# Patient Record
Sex: MALE | Race: WHITE | NOT HISPANIC OR LATINO | Employment: OTHER | URBAN - METROPOLITAN AREA
[De-identification: names, ages, dates, MRNs, and addresses within clinical notes are randomized per-mention and may not be internally consistent; named-entity substitution may affect disease eponyms.]

---

## 2020-08-12 ENCOUNTER — TRANSCRIBE ORDERS (OUTPATIENT)
Dept: ADMINISTRATIVE | Facility: HOSPITAL | Age: 75
End: 2020-08-12

## 2020-08-12 DIAGNOSIS — Z13.6 ENCOUNTER FOR SCREENING FOR VASCULAR DISEASE: Primary | ICD-10-CM

## 2020-09-03 ENCOUNTER — HOSPITAL ENCOUNTER (OUTPATIENT)
Dept: CARDIOLOGY | Facility: HOSPITAL | Age: 75
Discharge: HOME | End: 2020-09-03
Admitting: SURGERY

## 2020-09-03 VITALS
DIASTOLIC BLOOD PRESSURE: 84 MMHG | HEIGHT: 68 IN | HEART RATE: 68 BPM | SYSTOLIC BLOOD PRESSURE: 148 MMHG | BODY MASS INDEX: 28.04 KG/M2 | WEIGHT: 185 LBS

## 2020-09-03 DIAGNOSIS — Z13.6 ENCOUNTER FOR SCREENING FOR VASCULAR DISEASE: ICD-10-CM

## 2020-09-03 LAB
BH CV ECHO MEAS - DIST AO DIAM: 1.46 CM
BH CV VAS BP LEFT ARM: NORMAL MMHG
BH CV VAS BP RIGHT ARM: NORMAL MMHG
BH CV XLRA MEAS - MID AO DIAM: 1.7 CM
BH CV XLRA MEAS - PAD LEFT ABI DP: 1.35
BH CV XLRA MEAS - PAD LEFT ABI PT: 1.35
BH CV XLRA MEAS - PAD LEFT ARM: 148 MMHG
BH CV XLRA MEAS - PAD LEFT LEG DP: 200 MMHG
BH CV XLRA MEAS - PAD LEFT LEG PT: 200 MMHG
BH CV XLRA MEAS - PAD RIGHT ABI DP: 1.35
BH CV XLRA MEAS - PAD RIGHT ABI PT: 1.35
BH CV XLRA MEAS - PAD RIGHT ARM: 146 MMHG
BH CV XLRA MEAS - PAD RIGHT LEG DP: 200 MMHG
BH CV XLRA MEAS - PAD RIGHT LEG PT: 200 MMHG
BH CV XLRA MEAS - PROX AO DIAM: 1.91 CM
BH CV XLRA MEAS LEFT ICA/CCA RATIO: 1.64
BH CV XLRA MEAS LEFT MID CCA PSV: NORMAL CM/SEC
BH CV XLRA MEAS LEFT MID ICA PSV: NORMAL CM/SEC
BH CV XLRA MEAS LEFT PROX ECA PSV: NORMAL CM/SEC
BH CV XLRA MEAS RIGHT ICA/CCA RATIO: 1.02
BH CV XLRA MEAS RIGHT MID CCA PSV: NORMAL CM/SEC
BH CV XLRA MEAS RIGHT MID ICA PSV: NORMAL CM/SEC
BH CV XLRA MEAS RIGHT PROX ECA PSV: NORMAL CM/SEC

## 2020-09-03 PROCEDURE — 93799 UNLISTED CV SVC/PROCEDURE: CPT

## 2021-01-25 ENCOUNTER — IMMUNIZATION (OUTPATIENT)
Dept: VACCINE CLINIC | Facility: HOSPITAL | Age: 76
End: 2021-01-25

## 2021-01-25 PROCEDURE — 0001A: CPT | Performed by: INTERNAL MEDICINE

## 2021-01-25 PROCEDURE — 91300 HC SARSCOV02 VAC 30MCG/0.3ML IM: CPT | Performed by: INTERNAL MEDICINE

## 2021-02-15 ENCOUNTER — IMMUNIZATION (OUTPATIENT)
Dept: VACCINE CLINIC | Facility: HOSPITAL | Age: 76
End: 2021-02-15

## 2021-02-15 PROCEDURE — 0002A: CPT | Performed by: INTERNAL MEDICINE

## 2021-02-15 PROCEDURE — 91300 HC SARSCOV02 VAC 30MCG/0.3ML IM: CPT | Performed by: INTERNAL MEDICINE

## 2021-10-13 ENCOUNTER — TELEPHONE (OUTPATIENT)
Dept: ORTHOPEDIC SURGERY | Facility: CLINIC | Age: 76
End: 2021-10-13

## 2021-10-13 NOTE — TELEPHONE ENCOUNTER
Caller: Deepak Ruiz    Relationship: Self    Best call back number: [phone]    What is the medical concern/diagnosis: NECK PAIN AFTER A ROUND OF GOLF    What specialty or service is being requested: REFERRAL TO SPINE    What is the provider, practice or medical service name: DR PUENTES

## 2021-10-13 NOTE — TELEPHONE ENCOUNTER
Spoke with patient and told him that he would need to get a referral from his PCP to see Dr. Vega

## 2022-11-22 ENCOUNTER — OFFICE VISIT (OUTPATIENT)
Dept: ORTHOPEDIC SURGERY | Facility: CLINIC | Age: 77
End: 2022-11-22

## 2022-11-22 VITALS — RESPIRATION RATE: 12 BRPM | TEMPERATURE: 98.1 F | BODY MASS INDEX: 27.74 KG/M2 | HEIGHT: 68 IN | WEIGHT: 183 LBS

## 2022-11-22 DIAGNOSIS — M25.512 LEFT SHOULDER PAIN, UNSPECIFIED CHRONICITY: ICD-10-CM

## 2022-11-22 DIAGNOSIS — R22.32 MASS OF SKIN OF LEFT SHOULDER: Primary | ICD-10-CM

## 2022-11-22 PROCEDURE — 73030 X-RAY EXAM OF SHOULDER: CPT | Performed by: ORTHOPAEDIC SURGERY

## 2022-11-22 PROCEDURE — 99203 OFFICE O/P NEW LOW 30 MIN: CPT | Performed by: ORTHOPAEDIC SURGERY

## 2022-11-22 RX ORDER — ATORVASTATIN CALCIUM 20 MG/1
20 TABLET, FILM COATED ORAL DAILY
COMMUNITY
Start: 2022-09-21

## 2022-11-22 RX ORDER — ASPIRIN 81 MG/1
81 TABLET ORAL DAILY
COMMUNITY

## 2022-11-22 RX ORDER — LOSARTAN POTASSIUM 25 MG/1
25 TABLET ORAL DAILY
COMMUNITY
Start: 2022-09-06

## 2022-11-22 RX ORDER — UBIDECARENONE 50 MG
CAPSULE ORAL DAILY
COMMUNITY

## 2022-11-27 NOTE — PROGRESS NOTES
General Exam    Patient: Deepak Ruiz    YOB: 1945    Medical Record Number: [id number]    Chief Complaints: Left shoulder pain    History of Present Illness:     77 y.o. male patient who presents for evaluation treatment left shoulder pain.  Patient states he has been having issues for about a month and a half.  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  Reports there is like a band across the proximal aspect of his shoulder laterally based that he feels is giving him some discomfort.  Not all the time and does not really have any complaints with discomfort with shoulder motion.  Does have some difficulty at night with some pain and laying on that shoulder.    Denies any numbness or tingling.  Denies any fevers, cough or shortness of breath.    Allergies:   Allergies   Allergen Reactions   • Glycopyrrolate Other (See Comments)     BLADDER SHUT DOWN   • Penicillins    • Penicillin V Rash       Home Medications:      Current Outpatient Medications:   •  aspirin 81 MG EC tablet, Take 81 mg by mouth Daily., Disp: , Rfl:   •  atorvastatin (LIPITOR) 20 MG tablet, Take 20 mg by mouth Daily., Disp: , Rfl:   •  CIALIS 5 MG tablet, , Disp: , Rfl:   •  coenzyme Q10 50 MG capsule capsule, Take  by mouth Daily., Disp: , Rfl:   •  losartan (COZAAR) 25 MG tablet, Take 25 mg by mouth Daily., Disp: , Rfl:   •  Multiple Vitamin (MULTI VITAMIN DAILY PO), Take  by mouth., Disp: , Rfl:   •  Zinc Sulfate (ZINC 15 PO), Take  by mouth., Disp: , Rfl:   •  Multiple Vitamins-Minerals (ZINC PO), Take  by mouth., Disp: , Rfl:     History reviewed. No pertinent past medical history.    Past Surgical History:   Procedure Laterality Date   • COLONOSCOPY     • HEMORRHOIDECTOMY     • PROSTATE SURGERY     • TONSILLECTOMY     • WART REMOVAL     • WISDOM TOOTH EXTRACTION         Social History     Occupational History   • Not on file   Tobacco Use   • Smoking status: Never   • Smokeless tobacco:  "Former   • Tobacco comments:     Social for 8-10 years   Vaping Use   • Vaping Use: Never used   Substance and Sexual Activity   • Alcohol use: Yes     Alcohol/week: 2.0 standard drinks     Types: 2 Cans of beer per week   • Drug use: No   • Sexual activity: Not on file      Social History     Social History Narrative   • Not on file       History reviewed. No pertinent family history.    Review of Systems:      Constitutional: Denies fever, shaking or chills         All other pertinent positives and negatives as noted above in HPI.    Physical Exam: 77 y.o. male    Vitals:    11/22/22 1454   Resp: 12   Temp: 98.1 °F (36.7 °C)   Weight: 83 kg (183 lb)   Height: 172.7 cm (68\")       General:  Patient is awake and alert.  Appears in no acute distress or discomfort.        Musculoskeletal/Extremities:    Left upper extremity was examined I could palpate some cystic type masses along the lateral aspect of the shoulder with patient identified he also stated he has had symptoms along his chest area.  Overall shoulder range of motion is full and painless.  Rotator cuff strength intact.  Negative Bronson, Neer's, liftoff and empty can sign.  Motor and sensory intact distally.         Radiology:       3 views left shoulder AP, scapular Y and axillary taken reviewed to evaluate the patient's complaint/s.    Imaging shows some possible mild degenerative changes of the glenohumeral joint, mild to moderate degenerative changes of the AC joint.  There is also some superior migration of the humeral head in reference to the glenoid.  There is apparent subtle cystic type changes involving the humeral head slight density/irregularity within the proximal aspect of the humeral head does not appear to be lytic or blastic in nature no cortical involvement.  No cortical disruption noted.     No imaging for comparison.    Assessment: Left shoulder pain with soft tissue masses      Plan:      Discussed finds the patient it seems that some " of this discomfort could be coming from the cystic type masses that he describes and that are palpable on exam.  Overall his motor and sensory are intact.  There could be a component of his rotator cuff is well given radiographic findings.  Also just given some abnormalities on the radiographs and his history I think an MRI for further evaluation is warranted.  I like the patient to then follow-up with my shoulder specialist partner Dr. Aj once MRI has been completed.  There is any new issues during interim he was instructed to follow-up sooner.           We will plan for follow up as instructed.    All questions were answered.  Patient understands and agrees with the plan.    Nasir Keith MD    11/22/2022    CC to Rajeev Murphy MD

## 2022-12-16 ENCOUNTER — HOSPITAL ENCOUNTER (OUTPATIENT)
Dept: MRI IMAGING | Facility: HOSPITAL | Age: 77
Discharge: HOME | End: 2022-12-16
Admitting: ORTHOPAEDIC SURGERY

## 2022-12-16 DIAGNOSIS — R22.32 MASS OF SKIN OF LEFT SHOULDER: ICD-10-CM

## 2022-12-16 PROCEDURE — 73221 MRI JOINT UPR EXTREM W/O DYE: CPT

## 2022-12-28 ENCOUNTER — OFFICE VISIT (OUTPATIENT)
Dept: ORTHOPEDIC SURGERY | Facility: CLINIC | Age: 77
End: 2022-12-28

## 2022-12-28 VITALS — WEIGHT: 189.3 LBS | TEMPERATURE: 97.5 F | HEIGHT: 69 IN | BODY MASS INDEX: 28.04 KG/M2

## 2022-12-28 DIAGNOSIS — M25.512 CHRONIC LEFT SHOULDER PAIN: Primary | ICD-10-CM

## 2022-12-28 DIAGNOSIS — G89.29 CHRONIC LEFT SHOULDER PAIN: Primary | ICD-10-CM

## 2022-12-28 PROCEDURE — 99213 OFFICE O/P EST LOW 20 MIN: CPT | Performed by: ORTHOPAEDIC SURGERY

## 2022-12-28 NOTE — PROGRESS NOTES
Patient: Deepak Ruiz    YOB: 1945    Medical Record Number: [id number]    Chief Complaints: Left shoulder pain    History of Present Illness:     77 y.o. male patient who presents for evaluation of his left shoulder.  He has previously seen my partner for this issue and was referred to me as I have treated his wife in the past.  He does not recall any specific inciting event or factor.  The symptoms were gradual in onset.  He has a history of multiple lipomas and he thought that he may have a symptomatic lipoma in the shoulder.  He was recently referred for an MRI and has that study available for review.  He says the pain tends to be worse at night and when driving.  He is an avid golfer and is still able to play golf.  Current pain is mild and aching.    Allergies:   Allergies   Allergen Reactions   • Glycopyrrolate Other (See Comments)     BLADDER SHUT DOWN   • Penicillins    • Penicillin V Rash       Home Medications:    Current Outpatient Medications:   •  aspirin 81 MG EC tablet, Take 81 mg by mouth Daily., Disp: , Rfl:   •  atorvastatin (LIPITOR) 20 MG tablet, Take 20 mg by mouth Daily., Disp: , Rfl:   •  CIALIS 5 MG tablet, , Disp: , Rfl:   •  coenzyme Q10 50 MG capsule capsule, Take  by mouth Daily., Disp: , Rfl:   •  losartan (COZAAR) 25 MG tablet, Take 25 mg by mouth Daily., Disp: , Rfl:   •  Multiple Vitamin (MULTI VITAMIN DAILY PO), Take  by mouth., Disp: , Rfl:   •  Multiple Vitamins-Minerals (ZINC PO), Take  by mouth., Disp: , Rfl:   •  Zinc Sulfate (ZINC 15 PO), Take  by mouth., Disp: , Rfl:     History reviewed. No pertinent past medical history.    Past Surgical History:   Procedure Laterality Date   • COLONOSCOPY     • HEMORRHOIDECTOMY     • PROSTATE SURGERY     • TONSILLECTOMY     • WART REMOVAL     • WISDOM TOOTH EXTRACTION         Social History     Occupational History   • Not on file   Tobacco Use   • Smoking status: Never   • Smokeless tobacco: Former   • Tobacco comments:     " Social for 8-10 years   Vaping Use   • Vaping Use: Never used   Substance and Sexual Activity   • Alcohol use: Yes     Alcohol/week: 2.0 standard drinks     Types: 2 Cans of beer per week   • Drug use: No   • Sexual activity: Not on file      Social History     Social History Narrative   • Not on file       History reviewed. No pertinent family history.    Review of Systems:      Constitutional: Denies fever, shaking or chills   Eyes: Denies change in visual acuity   HEENT: Denies nasal congestion or sore throat   Respiratory: Denies cough or shortness of breath   Cardiovascular: Denies chest pain or edema  Endocrine: Denies tremors, palpitations, intolerance of heat or cold, polyuria, polydipsia.  GI: Denies abdominal pain, nausea, vomiting, bloody stools or diarrhea  : Denies frequency, urgency, incontinence, retention, or nocturia.  Musculoskeletal: Denies numbness, tingling or loss of motor function except as above  Integument: Denies rash, lesion or ulceration   Neurologic: Denies headache or focal weakness, deficits  Heme: Denies spontaneous or excessive bleeding, epistaxis, hematuria, melena, fatigue, enlarged or tender lymph nodes.      All other pertinent positives and negatives as noted above in HPI.    Physical Exam:   77 y.o. male  Vitals:    12/28/22 0807   Temp: 97.5 °F (36.4 °C)   TempSrc: Temporal   Weight: 85.9 kg (189 lb 4.8 oz)   Height: 175.3 cm (69\")     General:  Patient is awake and alert.  Appears in no acute distress or discomfort.    Psych:  Affect and demeanor are appropriate.    Cardiovascular:  Regular rate and rhythm.    Lungs:  Good chest expansion.  Breathing unlabored.    Extremities: Left shoulder is examined.  Skin is benign and intact.  Small mass palpable in the subcutaneous tissues consistent with possible lipoma.  This area is nontender.  No fluctuance or effusion.  No increased warmth.  Full motion.  He has pain and weakness with forward elevation in the scapular plane.  " Normal motor and sensory function in the lower arm and hand.  Palpable radial pulse.  Brisk capillary refill.    Imaging: MRI of the left shoulder is reviewed along with the associated report.  I have independently interpreted the images.  I agree with the radiology assessment.  He has a high-grade partial-thickness rotator cuff tear.  Radiology interpretation is listed below:    IMPRESSION:  Impingement morphology at the left acromion and  acromioclavicular joint with a broad, near full-thickness rotator cuff  tear involving the supraspinatus and infraspinatus tendons as discussed  above. There is no tendon retraction or muscle atrophy. No soft tissue  mass or cystic lesion is present anywhere around the left shoulder.    Assessment/Plan:  Left rotator cuff tear    He has what appears to be a high-grade partial-thickness tear.  We had a long discussion about the natural history of this condition and risk of tear progression.  We discussed all available treatment options, both surgical and nonsurgical.  He acknowledged understanding of this information.  All questions posed by him were answered in detail.  At this time, he has elected for nonsurgical treatment.  I offered him an injection for the pain.  He says the pain is not bad enough to justify that.  I offered him a referral to PT but he says he would prefer to do a home program.  I suggested that a prescription strength anti-inflammatory may be a good option for him but I asked him about GI issues.  He tells me that he has been battling some reflux issues right now.  I suggested he talk to his PCP about that issue but in the meantime it is probably best that he stick with Tylenol as needed.  For now, he will follow-up with me on an as-needed basis.  He asked about playing golf.  I told him I think that would be fine.    Zach Aj MD    12/28/2022

## 2023-08-04 ENCOUNTER — OFFICE VISIT (OUTPATIENT)
Dept: ORTHOPEDIC SURGERY | Facility: CLINIC | Age: 78
End: 2023-08-04
Payer: MEDICARE

## 2023-08-04 VITALS — BODY MASS INDEX: 27.64 KG/M2 | TEMPERATURE: 98.3 F | WEIGHT: 186.6 LBS | HEIGHT: 69 IN

## 2023-08-04 DIAGNOSIS — M75.101 TEAR OF RIGHT ROTATOR CUFF, UNSPECIFIED TEAR EXTENT, UNSPECIFIED WHETHER TRAUMATIC: ICD-10-CM

## 2023-08-04 DIAGNOSIS — R52 PAIN: Primary | ICD-10-CM

## 2023-08-04 PROCEDURE — 99213 OFFICE O/P EST LOW 20 MIN: CPT | Performed by: ORTHOPAEDIC SURGERY

## 2023-08-04 RX ORDER — MELATONIN
1000 DAILY
COMMUNITY

## 2023-08-04 RX ORDER — OMEPRAZOLE 40 MG/1
40 CAPSULE, DELAYED RELEASE ORAL DAILY
COMMUNITY
Start: 2023-04-23

## 2025-02-27 ENCOUNTER — TELEPHONE (OUTPATIENT)
Dept: ORTHOPEDIC SURGERY | Facility: CLINIC | Age: 80
End: 2025-02-27

## 2025-02-27 NOTE — TELEPHONE ENCOUNTER
"Caller: Deepak Ruiz    Relationship: Self    Best call back number: [phone]    What orders are you requesting (i.e. lab or imaging): REFERRAL TO [facility] FOR LOWER BACK PAIN     In what timeframe would the patient need to come in: ASAP    Where will you receive your lab/imaging services: [address]    Additional notes: PATIENT STATES THAT HIS FAMILY DOCTOR IS NOT THE OFFICE RIGHT NOW DUE TO CANCER TREATMENT AND HE CAN NOT GET A REFERRAL TO SEE [facility] FOR HIS BACK. HE WOULD LIKE DR PERRY TO REFER HIM TO [facility]. HE SAID TO LET HIM KNOW HE AND HIS WIFE HAVE BEEN \"COMING THERE FOR A LONG TIME.\" PLEASE ADVISE.          "

## 2025-02-28 NOTE — PROGRESS NOTES
Patient Name: Deepak Ruiz   YOB: 1945  Referring Primary Care Physician: Rajeev Murphy MD      Chief Complaint:    Chief Complaint   Patient presents with    Cervical Spine - Initial Evaluation, Pain        Previous Treatment:     PT for neck pain? No     MRI of C-Spine? No     Neurosurgery:   08/25/2011 - [facility] Randy Cantu MD           Neck Pain   This is a chronic problem. The current episode started more than 1 year ago. The problem occurs constantly. The problem has been gradually worsening. The pain is present in the left side, midline and right side. The quality of the pain is described as aching and cramping. The pain is at a severity of 3/10 (7/10 at worse). Pain severity now: mild - severe. The symptoms are aggravated by coughing and sneezing (Turning head left/right, looking up/down). Stiffness is present All day. Associated symptoms include headaches. Pertinent negatives include no numbness, tingling or weakness. He has tried acetaminophen and chiropractic manipulation for the symptoms. The treatment provided mild relief.        HPI:  Deepak Ruiz is a 79 y.o. male who presents to [facility] for evaluation of above complaints.  He primarily complains of axial neck pain referring throughout the trapezii as well as loss of range of motion of the cervical spine.  Denies any pain radiating down upper extremities.  Denies imbalance or incoordination or saddle anesthesia.  Denies any injury associated with the symptoms.  He is very active although does report quit playing golf is to avoid aggravating the symptoms.  Secondary complaint of chronic and worsening low back pain.  He was evaluated at [facility] in August 2024 and was referred to physical therapy.  There was some discussion of trialing injections but patient said there was no further follow-up.  This is an established patient to practice, new to me.  He  is unaccompanied today.  Prior pertinent records were reviewed.    PFSH:  See attached    ROS: As per HPI, otherwise negative    Objective:      79 y.o. male  Body mass index is 26.46 kg/m²., 81.3 kg (179 lb 3.2 oz), @HT@  Vitals:    03/03/25 0952   Temp: 97.8 °F (36.6 °C)     Pain Score    03/03/25 0952   PainSc: 3   Comment: 7/10 at worse   PainLoc: Neck            Spine Musculoskeletal Exam    Gait    Gait is normal.    Inspection    Coronal balance: no imbalance    Sagittal balance: no imbalance    Palpation    Cervical Spine    Tenderness: present      Paraspinous: right and left    Strength    Cervical Spine    Cervical spine motor exam is normal.    Sensory    Cervical Spine    Cervical spine sensation is normal.    Reflexes    Right        Biceps: 1/4      Brachioradialis: 1/4      Triceps: 1/4      Nava: absent    Left        Biceps: 1/4        Brachioradialis: 1/4      Triceps: 1/4      Nava: absent    General      Constitutional: well-developed and well-nourished    Scleral icterus: no    Labored breathing: no    Psychiatric: normal mood and affect and no acute distress    Neurological: alert and oriented x3    Skin: intact        IMAGING:     Indication: pain related symptoms,  Views: 2V AP&LAT cervical  Findings: Loss of normal lordosis, multilevel degenerative change with disc space settling most pronounced at C5-C6 where there is slight retrolisthesis of C5 on C6, suspect some ossification of the nuchal ligament posterior to the C6 spinous process  Comparison views: None for direct review      Official radiology interpretation will follow and be available in the patient medical records. Patient will be notified of any pertinent discrepancies.  Addendum 03/04/2025:  C1 and C2 are partially obscured on the AP view. C7-T1 is partially  obscured on the lateral view. Mild reversal of the normal cervical  lordosis could be related to patient positioning and/or degenerative  change. Mild likely  chronic/degenerative grade 1 retrolisthesis of C4 on  C5 and C5 on C6. Multilevel degenerative disc space narrowing and  degenerative endplate changes, most prominent at C4-C5 and C5-C6, where  there is mild anterior osteophyte formation. Multilevel disc  hypertrophy. The prevertebral soft tissues are unremarkable. Partial  ossification of the nuchal ligament. The included lung apices are clear  Assessment:           Diagnoses and all orders for this visit:    1. DDD (degenerative disc disease), cervical (Primary)  -     MRI Cervical Spine Without Contrast; Future  -     Ambulatory Referral to Physical Therapy for Evaluation & Treatment             Plan:  We reviewed the cervical plain films in office today as well as the report of the thoracic and lumbar MRIs from his visit with [place] [facility].  Today his primary complaint is the neck pain and stiffness although the low back pain slows him down nearly as much.  He has no loss of nerve function on exam today and no persistent radiculopathy.  We did discuss the importance of trialing physical therapy.  He says he has tried therapy in the past and really did not notice much benefit but is willing to try again.  Will also submit for cervical MRI with an eye toward injection therapy.  Will plan on seeing him back in about 6 weeks to see how he is progressing with physical therapy, discussed the MRI results and next treatment option.  Call in the meantime with questions or concerns.    Return in about 6 weeks (around 4/14/2025).    EMR Dragon/Transcription Disclaimer:   Much of this encounter note is an electronic transcription/translation of spoken language to printed text. The electronic translation of spoken language may permit erroneous, or at times, nonsensical words or phrases to be inadvertently transcribed; Although I have reviewed the note for such errors, some may still exist.  Red flags have been discussed at this or previous visits to include but not  limited weakness in extremities, worsening pain that does not respond to conservative treatment and bowel or bladder dysfunction. These are reasons to present to ER and patient has been informed.    The diagnosis(es), natural history, pathophysiology and treatment for diagnosis(es) were discussed. Opportunity given and questions answered. Biomechanics of pertinent body areas discussed.    EXERCISES:  Advice on benefits of, and types of regular/moderate exercise pertaining to diagnosis.  Continue HEP. For back or neck pain, recommend pilates and or yoga as tolerated. Generally it is best to start any new exercise under the guidance of a  or therapist.   MEDICATIONS:  When prescribe, the risks, benefits, warnings,side effects and alternatives of medications discussed. Advised against long term use of narcotics.   PAIN CONTROL:  Cold, heat, OTC lidocaine patches and/or ointment as needed. Avoid direct skin contact with ice. Ice 15-20 minutes 3-4 times daily as needed. For SI joint pain, recommend ice bath in water about 50 degrees for 5 consecutive days, add ice slowly to help with adjustment and may cover with warm towel or robe to help with cold tolerance. If using lidocaine, do not apply heat in conjunction as this can cause a burn.   MEDICAL RECORDS reviewed from other provider(s) for past and current medical history pertinent to this visit..

## 2025-03-03 ENCOUNTER — OFFICE VISIT (OUTPATIENT)
Dept: ORTHOPEDIC SURGERY | Facility: CLINIC | Age: 80
End: 2025-03-03
Payer: MEDICARE

## 2025-03-03 VITALS — TEMPERATURE: 97.8 F | HEIGHT: 69 IN | BODY MASS INDEX: 26.54 KG/M2 | WEIGHT: 179.2 LBS

## 2025-03-03 DIAGNOSIS — M50.30 DDD (DEGENERATIVE DISC DISEASE), CERVICAL: Primary | ICD-10-CM

## 2025-03-03 PROCEDURE — 99214 OFFICE O/P EST MOD 30 MIN: CPT | Performed by: NURSE PRACTITIONER

## 2025-03-03 PROCEDURE — 1160F RVW MEDS BY RX/DR IN RCRD: CPT | Performed by: NURSE PRACTITIONER

## 2025-03-03 PROCEDURE — 1159F MED LIST DOCD IN RCRD: CPT | Performed by: NURSE PRACTITIONER

## 2025-03-03 RX ORDER — EVOLOCUMAB 140 MG/ML
INJECTION, SOLUTION SUBCUTANEOUS
COMMUNITY
Start: 2024-07-01

## 2025-04-02 ENCOUNTER — HOSPITAL ENCOUNTER (OUTPATIENT)
Facility: HOSPITAL | Age: 80
Discharge: HOME | End: 2025-04-02
Admitting: NURSE PRACTITIONER
Payer: MEDICARE

## 2025-04-02 DIAGNOSIS — M50.30 DDD (DEGENERATIVE DISC DISEASE), CERVICAL: ICD-10-CM

## 2025-04-02 PROCEDURE — 72141 MRI NECK SPINE W/O DYE: CPT

## 2025-04-14 ENCOUNTER — OFFICE VISIT (OUTPATIENT)
Dept: ORTHOPEDIC SURGERY | Facility: CLINIC | Age: 80
End: 2025-04-14
Payer: MEDICARE

## 2025-04-14 VITALS — HEIGHT: 69 IN | TEMPERATURE: 97.7 F | WEIGHT: 180 LBS | BODY MASS INDEX: 26.66 KG/M2

## 2025-04-14 DIAGNOSIS — M50.30 DDD (DEGENERATIVE DISC DISEASE), CERVICAL: Primary | ICD-10-CM

## 2025-04-14 DIAGNOSIS — M54.12 CERVICAL RADICULOPATHY: ICD-10-CM

## 2025-04-14 PROCEDURE — 1159F MED LIST DOCD IN RCRD: CPT | Performed by: NURSE PRACTITIONER

## 2025-04-14 PROCEDURE — 1160F RVW MEDS BY RX/DR IN RCRD: CPT | Performed by: NURSE PRACTITIONER

## 2025-04-14 PROCEDURE — 99213 OFFICE O/P EST LOW 20 MIN: CPT | Performed by: NURSE PRACTITIONER

## 2025-04-14 RX ORDER — EVOLOCUMAB 140 MG/ML
INJECTION, SOLUTION SUBCUTANEOUS
COMMUNITY
Start: 2025-03-19

## 2025-04-14 NOTE — PROGRESS NOTES
Patient Name: Deepak Ruiz   YOB: 1945  Referring Primary Care Physician: Rajeev Murphy MD      Chief Complaint:    Chief Complaint   Patient presents with    Cervical Spine - Follow-up, Pain          HPI:  Deepak Ruiz is a 79 y.o. male who presents to [facility] for follow-up with cervical MRI.  No significant change since last visit 03/03/2025.  At that visit, we did discuss the rationale for physical therapy.  He decided against the physical therapy.  He has been to therapy the past and says he really never saw any benefit.  He has also been treated by chiropractor in the past for his low back and his neck.  He says although it helped with the low back, he feels like it probably aggravated his neck.  Secondary complaint of right hip pain today.  Pain is on the lateral aspect of the right hip.  He says he went for a walk and since he used to be a runner he thought he would try to sprint and had sudden pain in the right lateral hip.  No groin pain or pain referring down the lower extremities.    PFSH:  See attached    ROS: As per HPI, otherwise negative    Objective:      79 y.o. male  Body mass index is 26.58 kg/m²., 81.6 kg (180 lb), @HT@  Vitals:    04/14/25 1001   Temp: 97.7 °F (36.5 °C)     Pain Score    04/14/25 1001   PainSc: 3    PainLoc: Neck            Spine Musculoskeletal Exam    Gait    Gait is normal.    Palpation    Thoracolumbar    Tenderness: present      Greater trochanter: right    General      Constitutional: well-developed and well-nourished    Scleral icterus: no    Labored breathing: no    Psychiatric: normal mood and affect and no acute distress    Neurological: alert and oriented x3        IMAGING:     Discussed cervical MRI 04/02/2025.  Findings are as follows:  1. Multilevel cervical spondylosis, most prominent at C5-C6, as detailed  above.  2. At C5-C6, spondylosis and grade 1 retrolisthesis results in moderate  spinal canal stenosis,  abutment of the anterior cervical cord, severe  right neural foraminal narrowing, and moderate left neural foraminal  narrowing.  3. At C3-C4, there is mild to moderate right neural foraminal narrowing.  4. At C4-C5, there is mild to moderate right neural foraminal narrowing  and moderate left neural foraminal narrowing.  5. At C6-C7, there is moderate left neural foraminal narrowing and mild  to moderate right neural foraminal narrowing.  6. At C7-T1, there is moderate right neural foraminal narrowing.    Assessment:           Diagnoses and all orders for this visit:    1. DDD (degenerative disc disease), cervical (Primary)  -     Ambulatory Referral to Pain Management Clinic    2. Cervical radiculopathy  -     Ambulatory Referral to Pain Management Clinic             Plan:  He does have multilevel foraminal narrowing worse on the right and at least moderate central stenosis at C5-6 that likely explain his symptoms.  We did discuss physical therapy again today.  He will call if he changes his mind about physical therapy at that point may refer him to physical therapy and treated with square as he would like to work on getting back into playing golf.  He was advised to avoid chiropractic treatment for cervical spine and he says he had already decided against it in the future.  For more symptom relief, will refer him to pain management to discuss cervical epidural injections and possibly RFA.  He has no myelopathic findings or loss of nerve function on exam so at this point we will exhaust conservative efforts and follow-up as needed.    Return if symptoms worsen or fail to improve.    EMR Dragon/Transcription Disclaimer:   Much of this encounter note is an electronic transcription/translation of spoken language to printed text. The electronic translation of spoken language may permit erroneous, or at times, nonsensical words or phrases to be inadvertently transcribed; Although I have reviewed the note for such  errors, some may still exist.  Red flags have been discussed at this or previous visits to include but not limited weakness in extremities, worsening pain that does not respond to conservative treatment and bowel or bladder dysfunction. These are reasons to present to ER and patient has been informed.    The diagnosis(es), natural history, pathophysiology and treatment for diagnosis(es) were discussed. Opportunity given and questions answered. Biomechanics of pertinent body areas discussed.    EXERCISES:  Advice on benefits of, and types of regular/moderate exercise pertaining to diagnosis.  Continue HEP. For back or neck pain, recommend pilates and or yoga as tolerated. Generally it is best to start any new exercise under the guidance of a  or therapist.   MEDICATIONS:  When prescribe, the risks, benefits, warnings,side effects and alternatives of medications discussed. Advised against long term use of narcotics.   PAIN CONTROL:  Cold, heat, OTC lidocaine patches and/or ointment as needed. Avoid direct skin contact with ice. Ice 15-20 minutes 3-4 times daily as needed. For SI joint pain, recommend ice bath in water about 50 degrees for 5 consecutive days, add ice slowly to help with adjustment and may cover with warm towel or robe to help with cold tolerance. If using lidocaine, do not apply heat in conjunction as this can cause a burn.   MEDICAL RECORDS reviewed from other provider(s) for past and current medical history pertinent to this visit..

## 2025-05-07 ENCOUNTER — OFFICE VISIT (OUTPATIENT)
Dept: PAIN MEDICINE | Facility: CLINIC | Age: 80
End: 2025-05-07
Payer: MEDICARE

## 2025-05-07 ENCOUNTER — PREP FOR SURGERY (OUTPATIENT)
Dept: OTHER | Facility: HOSPITAL | Age: 80
End: 2025-05-07
Payer: MEDICARE

## 2025-05-07 VITALS
OXYGEN SATURATION: 97 % | WEIGHT: 180.6 LBS | HEART RATE: 70 BPM | DIASTOLIC BLOOD PRESSURE: 73 MMHG | BODY MASS INDEX: 26.75 KG/M2 | TEMPERATURE: 96.5 F | SYSTOLIC BLOOD PRESSURE: 148 MMHG | RESPIRATION RATE: 18 BRPM | HEIGHT: 69 IN

## 2025-05-07 DIAGNOSIS — M47.812 CERVICAL SPONDYLOSIS WITHOUT MYELOPATHY: Primary | ICD-10-CM

## 2025-05-07 DIAGNOSIS — M48.02 CERVICAL SPINAL STENOSIS: ICD-10-CM

## 2025-05-07 DIAGNOSIS — M50.30 DEGENERATIVE DISC DISEASE, CERVICAL: ICD-10-CM

## 2025-05-07 PROCEDURE — 1125F AMNT PAIN NOTED PAIN PRSNT: CPT | Performed by: PHYSICIAN ASSISTANT

## 2025-05-07 PROCEDURE — 1159F MED LIST DOCD IN RCRD: CPT | Performed by: PHYSICIAN ASSISTANT

## 2025-05-07 PROCEDURE — 99204 OFFICE O/P NEW MOD 45 MIN: CPT | Performed by: PHYSICIAN ASSISTANT

## 2025-05-07 PROCEDURE — 1160F RVW MEDS BY RX/DR IN RCRD: CPT | Performed by: PHYSICIAN ASSISTANT

## 2025-05-07 RX ORDER — ACETAMINOPHEN 500 MG
500 TABLET ORAL EVERY 6 HOURS PRN
COMMUNITY

## 2025-05-07 NOTE — PROGRESS NOTES
"CHIEF COMPLAINT  Neck pain  Bilateral neck pain shoots up to his head \"sometimes,it gives me a headache.\"    Subjective   Deepak Ruiz is a right hand dominant 79 y.o. male.   He presents to the office for initial evaluation of neck pain. He was referred here by RUBIO Ramos.  Patient reports that approximately 1 year ago he began having pain affecting the posterior cervical spine which usually originates in the midline region and refers into the bilateral paraspinal muscles.  Pain is described as an aching and cramping sensation as well as stiffness that is present all day.  He does have associated symptoms with concomitant headaches.  He finds that pain is aggravated with looking upwards as well as turning to look over either side and can also be very aggravated when sleeping on his pillow that time can be very sharp and shooting.  Denies any pain radiating into the bilateral shoulders or the upper extremities though he does have reported right shoulder pain due to torn rotator cuff.    Patient denies any history of cervical or lumbar spine surgery.  He has never had any type of interventional pain procedures.    He has not yet attended physical therapy but is willing to do so at this time.  Did have 1 chiropractic therapy session with manipulation of the neck which she feels exacerbated some of his symptoms.  He uses ice therapy as well as a massager and is also applied a TENS unit with short-term relief.    Pain is 2/10 at this time however will increase to 5/10 with certain positions and activity.      Neck Pain   This is a chronic problem. The current episode started more than 1 year ago. The problem occurs constantly. The problem has been unchanged. The pain is associated with nothing. The pain is present in the left side, right side and midline. The quality of the pain is described as cramping. The pain is at a severity of 2/10. The pain is mild (increases to 5/10). The pain is Worse during the night " (can also fluctuate during the day). Associated symptoms include headaches. Pertinent negatives include no chest pain, fever, numbness or weakness. He has tried ice (TENS unit) for the symptoms. The treatment provided mild relief.        PEG Assessment   What number best describes your pain on average in the past week?5  What number best describes how, during the past week, pain has interfered with your enjoyment of life?0  What number best describes how, during the past week, pain has interfered with your general activity?  8        Current Outpatient Medications:     acetaminophen (TYLENOL) 500 MG tablet, Take 1 tablet by mouth Every 6 (Six) Hours As Needed for Mild Pain., Disp: , Rfl:     aspirin 81 MG EC tablet, Take 1 tablet by mouth Daily., Disp: , Rfl:     CIALIS 5 MG tablet, , Disp: , Rfl:     Coenzyme Q10 60 MG capsule, Take  by mouth., Disp: , Rfl:     Evolocumab (Repatha) solution prefilled syringe injection, , Disp: , Rfl:     losartan (COZAAR) 25 MG tablet, Take 1 tablet by mouth Daily., Disp: , Rfl:     Multiple Vitamin (MULTI VITAMIN DAILY PO), Take  by mouth., Disp: , Rfl:     Multiple Vitamins-Minerals (ZINC PO), Take  by mouth., Disp: , Rfl:     Repatha SureClick solution auto-injector SureClick injection, , Disp: , Rfl:     omeprazole (priLOSEC) 40 MG capsule, Take 1 capsule by mouth Daily., Disp: , Rfl:     Zinc Sulfate (ZINC 15 PO), Take  by mouth., Disp: , Rfl:     The following portions of the patient's history were reviewed and updated as appropriate: allergies, current medications, past family history, past medical history, past social history, past surgical history, and problem list.      REVIEW OF PERTINENT MEDICAL DATA    MRI CERVICAL SPINE WO CONTRAST-     Date of Exam: 4/2/2025 12:41 PM     Indication: Neck pain radiating to the right shoulder.     Comparison: Radiographs 3/3/2025.     Technique: Multiplanar, multisequence MR imaging of the cervical spine  was performed without  contrast.     FINDINGS:  Mild reversal the normal cervical lordosis could be related to patient  positioning and/or degenerative change. Mild likely chronic/degenerative  grade 1 retrolisthesis of C5 on C6 with degenerative endplate changes at  C5-C6. Moderate hypertrophic degenerative changes at the anterior median  atlantoaxial joint. The cervical vertebral bodies otherwise demonstrate  well preserved height and alignment. No evidence of acute fracture of  the cervical spine. Mild diffusely heterogeneous bone marrow signal  could reflect red marrow reconversion. No concerning osseous lesion.   The cervical spinal cord demonstrates normal signal and caliber  throughout. The partially imaged posterior fossa is unremarkable.  Partially imaged paranasal sinus disease. The paraspinal soft tissues  are unremarkable.     C2-C3: Intervertebral disc desiccation and bilateral facet hypertrophy.  No significant spinal canal stenosis or neural foraminal narrowing.     C3-C4: Intervertebral disc desiccation with mild facet and uncinate  process hypertrophy. Findings result in mild to moderate right neural  foraminal narrowing and mild left neural foraminal narrowing. No  significant spinal canal stenosis.     C4-C5: Intervertebral disc desiccation with bilateral facet and uncinate  process hypertrophy. Findings result in mild to moderate right neural  foraminal narrowing and moderate left neural foraminal narrowing. No  significant spinal canal stenosis.     C5-C6: Mild grade 1 retrolisthesis with a broad-based posterior disc  osteophyte complex and bilateral facet and uncinate process hypertrophy.  Findings result in moderate spinal canal stenosis (AP canal diameter 7  mm), abutment of the anterior cervical cord, severe right neural  foraminal narrowing, and moderate left neural foraminal narrowing.     C6-C7: Small broad-based left posterior paracentral disc protrusion with  left greater than right uncinate process  hypertrophy. Findings result in  mild indentation of the anterior left side of the thecal sac, moderate  left neural foraminal narrowing, and mild to moderate right neural  foraminal narrowing.     C7-T1: Bilateral facet hypertrophy. Findings result in mild to moderate  right neuroforaminal narrowing. No significant spinal canal stenosis or  left neural foraminal narrowing.     IMPRESSION:     1. Multilevel cervical spondylosis, most prominent at C5-C6, as detailed  above.  2. At C5-C6, spondylosis and grade 1 retrolisthesis results in moderate  spinal canal stenosis, abutment of the anterior cervical cord, severe  right neural foraminal narrowing, and moderate left neural foraminal  narrowing.  3. At C3-C4, there is mild to moderate right neural foraminal narrowing.  4. At C4-C5, there is mild to moderate right neural foraminal narrowing  and moderate left neural foraminal narrowing.  5. At C6-C7, there is moderate left neural foraminal narrowing and mild  to moderate right neural foraminal narrowing.  6. At C7-T1, there is moderate right neural foraminal narrowing.           This report was finalized on 4/2/2025 5:30 PM by Jaguar Rubio MD    Review of Systems   Constitutional:  Negative for activity change (limited), fatigue and fever.   Respiratory:  Negative for cough and chest tightness.    Cardiovascular:  Negative for chest pain.   Gastrointestinal:  Positive for constipation. Negative for abdominal pain and diarrhea.   Musculoskeletal:  Positive for neck pain.   Neurological:  Positive for dizziness, light-headedness and headaches. Negative for weakness and numbness.   Psychiatric/Behavioral:  Negative for agitation, sleep disturbance and suicidal ideas. The patient is not nervous/anxious.        I have reviewed and confirmed the accuracy of the ROS as documented by the MA/ALBERTO/LUZMA Bhat    Vitals:    05/07/25 0933   BP: 148/73   BP Location: Left arm   Patient Position: Sitting   Cuff Size: Adult  "  Pulse: 70   Resp: 18   Temp: 96.5 °F (35.8 °C)   TempSrc: Temporal   SpO2: 97%   Weight: 81.9 kg (180 lb 9.6 oz)   Height: 175.3 cm (69\")   PainSc: 2          Objective       Physical Exam  Vitals and nursing note reviewed. Exam conducted with a chaperone present (Wife).   Constitutional:       Appearance: Normal appearance. He is normal weight.   HENT:      Head: Normocephalic.   Pulmonary:      Effort: Pulmonary effort is normal.   Musculoskeletal:      Cervical back: Tenderness (MILD PAIN TO PALPATION OVER THE BILATERAL CERVICAL FACET JOINT SPACES) present. Pain with movement (PAIN WITH CERVICAL EXTENSION AND BILATERAL LATERAL ROTATION/BENDING), spinous process tenderness and muscular tenderness present. Decreased range of motion.        Back:    Skin:     General: Skin is warm and dry.   Neurological:      General: No focal deficit present.      Mental Status: He is alert and oriented to person, place, and time.      Cranial Nerves: Cranial nerves 2-12 are intact.      Sensory: Sensation is intact.      Motor: Motor function is intact.      Gait: Gait is intact.      Deep Tendon Reflexes:      Reflex Scores:       Tricep reflexes are 1+ on the right side and 1+ on the left side.       Bicep reflexes are 1+ on the right side and 1+ on the left side.       Brachioradialis reflexes are 1+ on the right side and 1+ on the left side.  Psychiatric:         Mood and Affect: Mood normal.         Behavior: Behavior normal.         Thought Content: Thought content normal.         Judgment: Judgment normal.         Assessment & Plan   Diagnoses and all orders for this visit:    1. Cervical spondylosis without myelopathy (Primary)  -     Ambulatory Referral to Physical Therapy for Evaluation & Treatment    2. Cervical spinal stenosis  -     Ambulatory Referral to Physical Therapy for Evaluation & Treatment    3. Degenerative disc disease, cervical  -     Ambulatory Referral to Physical Therapy for Evaluation & " Treatment        --- Deepak Ruiz reports a pain score of 2.  Given his pain assessment as noted, treatment options were discussed and the following options were decided upon as a follow-up plan to address the patient's pain: continuation of current treatment plan for pain, patient not interested in pharmacological measures, and use of non-medical modalities (ice, heat, stretching and/or behavior modifications).    PHQ-2 Depression Screening  Little interest or pleasure in doing things? Not at all   Feeling down, depressed, or hopeless? Not at all   PHQ-2 Total Score 0       Tobacco Use: Medium Risk (5/7/2025)    Patient History     Smoking Tobacco Use: Former     Smokeless Tobacco Use: Former     Passive Exposure: Past         --- Based on the patient's physical exam and MRI findings I feel that he is a candidate to proceed with #1 diagnostic bilateral C3-5 medial branch blocks under fluoroscopy guidance with plan to proceed to radiofrequency ablation with favorable response.  The risk and benefits of the procedure have been discussed with the patient and all questions have been addressed.  --- Follow-up 1 week after undergoing injective therapy  --- Deepak may be a candidate in the future for consideration of cervical SYDNI's         Pain / Disability Scale    The scale used for measurement of pain and/or disability for this patient was the [place] back pain disability scale.  The score was 3 on 05/07/2025          Diagnostic Facet Joint Procedure:   MBB     The first diagnostic facet joint procedure is considered medically reasonable and necessary for the diagnosis and treatment of chronic pain for this patient due to the patient meeting all of the following criteria:    - 1. Moderate to severe chronic neck or low back pain, predominantly axial, that causes functional deficit measured on pain or disability scale.  - 2. Pain present for minimum of 3 months with documented failure to respond to noninvasive conservative  management (as tolerated)  - 3. Absence of untreated radiculopathy or neurogenic claudication (except for radiculopathy caused by facet joint synovial cyst)  - 4. There is no non-facet pathology per clinical assessment or radiology studies that could explain the source of the patient’s pain, including but not limited to fracture, tumor, infection, or significant deformity.    The confirmatory diagnostic facet joint procedure is considered medically reasonable and necessary for the diagnosis and treatment of chronic pain for this patient due to the patient meeting all of the following criteria:    - 1. Moderate to severe chronic neck or low back pain, predominantly axial, that causes functional deficit measured on pain or disability scale.  - 2. Pain present for minimum of 3 months with documented failure to respond to noninvasive conservative management (as tolerated)  - 3. Absence of untreated radiculopathy or neurogenic claudication (except for radiculopathy caused by facet joint synovial cyst)  - 4. There is no non-facet pathology per clinical assessment or radiology studies that could explain the source of the patient’s pain, including but not limited to fracture, tumor, infection, or significant deformity.    The patient has also shown a consistent positive response of at least 80% relief of primary (index) pain (with the duration of relief being consistent with the agent used).      ADRIÁN REPORT    ADRIÁN report has been reviewed and scanned into the patient's chart.    As the clinician, I personally reviewed the ADRIÁN from 5/7/25 while the patient was in the office today.      Dictated utilizing Dragon dictation.

## 2025-05-22 ENCOUNTER — TELEPHONE (OUTPATIENT)
Dept: PAIN MEDICINE | Facility: CLINIC | Age: 80
End: 2025-05-22

## 2025-05-22 NOTE — TELEPHONE ENCOUNTER
Caller: Deepak Ruiz    Relationship to patient: Self    Best call back number: [phone]    Patient is needing: PATIENT NEEDS BOTH PROCEDURES CANCELED - 6/4/25 AND 7/2/25   HE IS GOING TO TRY PT FIRST AND SEE HOW IT DOES

## 2025-06-27 ENCOUNTER — TREATMENT (OUTPATIENT)
Age: 80
End: 2025-06-27
Payer: MEDICARE

## 2025-06-27 DIAGNOSIS — M54.2 CHRONIC NECK AND BACK PAIN: ICD-10-CM

## 2025-06-27 DIAGNOSIS — M54.9 CHRONIC NECK AND BACK PAIN: ICD-10-CM

## 2025-06-27 DIAGNOSIS — M47.812 CERVICAL SPONDYLOSIS WITHOUT MYELOPATHY: Primary | ICD-10-CM

## 2025-06-27 DIAGNOSIS — M50.30 DEGENERATIVE DISC DISEASE, CERVICAL: ICD-10-CM

## 2025-06-27 DIAGNOSIS — R29.3 POSTURE IMBALANCE: ICD-10-CM

## 2025-06-27 DIAGNOSIS — M48.02 CERVICAL SPINAL STENOSIS: ICD-10-CM

## 2025-06-27 DIAGNOSIS — G89.29 CHRONIC NECK AND BACK PAIN: ICD-10-CM

## 2025-06-27 NOTE — PROGRESS NOTES
Physical Therapy Initial Evaluation and Plan of Care  [facility] [place]   [address] [place] [address]  P: [phone]       F: [phone]       Patient: Deepak Ruiz   : 1945  Visit Diagnoses:     ICD-10-CM ICD-9-CM   1. Cervical spondylosis without myelopathy  M47.812 721.0   2. Cervical spinal stenosis  M48.02 723.0   3. Degenerative disc disease, cervical  M50.30 722.4   4. Posture imbalance  R29.3 729.90   5. Chronic neck and back pain  M54.2 723.1    M54.9 724.5    G89.29 338.29     Referring practitioner: LUZMA Strange  Date of Initial Visit: 2025  Today's Date: 2025  Patient seen for 1 sessions           Subjective Questionnaire: NDI:       Subjective Evaluation    History of Present Illness  Date of onset: 2025  Mechanism of injury: Patient reports neck pain and head aches since beginning of this year.  Gets cramping in his neck and at times feels his throat is closing-this is trigger by straining to lift or with straining to have BM.    Uses inversion table 4x/wk, full vertical hang, pulls on weights to give extra stretch.  Also pulls up on head to get relief.     Stopped playing golf about a year ago due to having severe pain in neck and back after.      Had difficulty getting comfortable with his pillow to sleep.  Has increased pain with moving his head around.    Walks almost every day at the mall in the mornings.      Gets light headedness about 2 months ago.  Symptoms are random.  History of vertigo but current symptoms don't feel like his prior symptoms.      Burning in thumb and ring finger on the left hand, occasionally in the left foot-3 middle toes.    Patient reports he was trimming limbs off a tree yesterday using a telescopic .  States he pulled really hard and strained his left shoulder.  States since then he has had a raised appear in the top/back left shoulder.  Reports he has also had pain and  limited mobility in his left shoulder since.    Reports a history of right shoulder rotator cuff tear and recommendations to have a shoulder replacement however he opted not to proceed with surgery.      Subjective comment: Patient reports neck  Patient Occupation: Retired  Pain  Location: Neck  Quality: dull ache and burning  Aggravating factors: sleeping, movement and lifting    Social Support  Lives with: spouse    Hand dominance: right    Diagnostic Tests  X-ray: abnormal  MRI studies: abnormal    Treatments  Previous treatment: chiropractic  Current treatment: medication  Patient Goals  Patient goals for therapy: decreased pain, increased motion and increased strength         Past Medical History:   Diagnosis Date    Cervical disc disorder     Joint pain     Neck pain     Rotator cuff syndrome 2023     Past Surgical History:   Procedure Laterality Date    COLONOSCOPY      HEMORRHOIDECTOMY      PROSTATE SURGERY      TONSILLECTOMY      WART REMOVAL      WISDOM TOOTH EXTRACTION           Objective          Postural Observations    Additional Postural Observation Details  Forward head, protracted shoulders, increased thoracic kyphosis, decreased cervical lordosis    Palpation   Left   Hypertonic in the scalenes, suboccipitals and upper trapezius.   Tenderness of the scalenes, suboccipitals and upper trapezius.     Right   Hypertonic in the scalenes, suboccipitals and upper trapezius. Tenderness of the scalenes, suboccipitals and upper trapezius.     Tenderness   Cervical Spine   Tenderness in the spinous process, left scapula, left 1st rib and right 1st rib.     Active Range of Motion   Cervical/Thoracic Spine   Cervical    Flexion: 40 degrees   Extension: 15 degrees with pain  Left lateral flexion: 13 degrees with pain  Right lateral flexion: 10 degrees with pain  Left rotation: 40 degrees with pain  Right rotation: 40 degrees with pain    Strength/Myotome Testing   Cervical Spine   Neck extension: 4  Neck  flexion: 4    Left   Neck lateral flexion (C3): 4    Right   Neck lateral flexion (C3): 4    Left Shoulder     Planes of Motion   Flexion: 4- (painful)   Extension: 5   Abduction: 4   Adduction: 5   External rotation at 0°: 4   Internal rotation at 0°: 5     Right Shoulder     Planes of Motion   Flexion: 3+ (painful)   Extension: 5   Abduction: 4   Adduction: 5   External rotation at 0°: 4   Internal rotation at 0°: 5     Left Elbow   Flexion: 5  Extension: 5    Right Elbow   Flexion: 5  Extension: 5    Left Wrist/Hand   Wrist extension: 5  Wrist flexion: 5     (2nd hand position)   Left  strength (2nd hand position) 60 lbs    Right Wrist/Hand   Wrist extension: 5  Wrist flexion: 5     (2nd hand position)   Right  strength (2nd hand position) 70 lbs    Tests   Cervical   Positive repeated extension and repeated flexion.    Additional Tests Details  UE neural tension test: Left-positive median nerve    Cervical Flexibility Comments:   Decreased cervical/UQ muscle flexibility          Assessment & Plan       Assessment  Impairments: abnormal muscle tone, abnormal or restricted ROM, impaired physical strength and pain with function   Functional limitations: sleeping and uncomfortable because of pain   Assessment details: Deepak Ruiz is a pleasant 79 y.o. male that presents with decreased cervical spine range of motion, decreased cervical spine/upper extremity strength, decreased cervical spine/UQ muscle flexibility, postural imbalance, pain limited functional activity tolerance. Pt will benefit from skilled PT services in order to address listed impairments, decrease pain and restore function.    Prognosis: good  Prognosis details: Patient demonstrates good rehab potential as evidenced by high motivation to participate with PT POC and to return to PLOF/ADLs/IADLs    Goals  Plan Goals: Short Term Goals (3 wks):  1.  Patient will have increased cervical spine ROM to WFL without severe pain  provocation.  2.  Patient will demonstrate improved posture to minimize forward head and protracted shoulder alignment.  3.  Patient will have increased cervical spine strength to 5/5.  4.  Patient will negative left upper extremity neural tension test.      Long Term Goals (4-6wks):  1.  Patient will be independent in performance of HEP for carryover upon discharge from skilled PT services.  2.  Patient will have improved NDI score of 5/50 or better.  3.  Patient will have increased cervical spine/UQ muscle flexibility to WFL.  4.  Patient will have increased  strength of 70 pounds or better.    Plan  Therapy options: will be seen for skilled therapy services  Planned modality interventions: cryotherapy, TENS, dry needling and thermotherapy (hydrocollator packs)  Planned therapy interventions: manual therapy, soft tissue mobilization, spinal/joint mobilization, strengthening, stretching, flexibility, functional ROM exercises, joint mobilization, home exercise program, neuromuscular re-education, postural training, therapeutic activities and body mechanics training  Frequency: 1x week  Duration in weeks: 6  Treatment plan discussed with: patient  Plan details: Pt was educated on the importance of their HEP and their current need for continued skilled physical therapy. Patients goals and potential limitations were discussed and pt is in agreement with current plan of care and treatment emphasis.              History # of Personal Factors and/or Comorbidities: HIGH (3+)  Examination of Body System(s): # of elements: MODERATE (3)  Clinical Presentation: STABLE   Clinical Decision Making: MODERATE      Timed:         Manual Therapy:         mins  31759;     Therapeutic Exercise:    5     mins  90452;     Neuromuscular Indra:        mins  35060;    Therapeutic Activity:     23     mins  77196;     Gait Training:           mins  55079;     Ultrasound:          mins  29515;    Ionto                                  mins   47742  Self Care                            mins  02468  Canalith Repos         mins  76204  Orthotic MGMT/Train         mins  37997    Un-Timed:  Electrical Stimulation:         mins  38601 ( );  Dry Needling:          mins  84500 self-pay;  Dry Needling:          mins  55749 self-pay  Traction          mins  97383  Low Eval          mins  75643  Mod Eval     30     mins  17965  High Eval                            mins  48013    Timed Treatment:   28   mins   Total Treatment:     58   mins      PT SIGNATURE: Georgina Gomez PT     License Number: [id number]  Electronically signed by Georgina Gomez PT, 06/27/25, 9:57 AM EDT      DATE TREATMENT INITIATED: 6/27/2025    Initial Certification  Certification Period: 6/27/2025 thru 9/24/2025  I certify that the therapy services are furnished while this patient is under my care.  The services outlined above are required by this patient, and will be reviewed every 90 days.     PHYSICIAN: Nazario Caceres PA      NPI: [id number]  DATE:         Please sign and return via fax to [phone]. Thank you, [facility].

## 2025-06-30 ENCOUNTER — TELEPHONE (OUTPATIENT)
Dept: PAIN MEDICINE | Facility: CLINIC | Age: 80
End: 2025-06-30
Payer: MEDICARE

## 2025-06-30 ENCOUNTER — TREATMENT (OUTPATIENT)
Age: 80
End: 2025-06-30
Payer: MEDICARE

## 2025-06-30 DIAGNOSIS — M54.2 CHRONIC NECK AND BACK PAIN: ICD-10-CM

## 2025-06-30 DIAGNOSIS — M54.9 CHRONIC NECK AND BACK PAIN: ICD-10-CM

## 2025-06-30 DIAGNOSIS — R29.3 POSTURE IMBALANCE: ICD-10-CM

## 2025-06-30 DIAGNOSIS — M50.30 DEGENERATIVE DISC DISEASE, CERVICAL: ICD-10-CM

## 2025-06-30 DIAGNOSIS — M48.02 CERVICAL SPINAL STENOSIS: ICD-10-CM

## 2025-06-30 DIAGNOSIS — M47.812 CERVICAL SPONDYLOSIS WITHOUT MYELOPATHY: Primary | ICD-10-CM

## 2025-06-30 DIAGNOSIS — G89.29 CHRONIC NECK AND BACK PAIN: ICD-10-CM

## 2025-06-30 PROCEDURE — 97140 MANUAL THERAPY 1/> REGIONS: CPT | Performed by: PHYSICAL THERAPIST

## 2025-06-30 PROCEDURE — 97110 THERAPEUTIC EXERCISES: CPT | Performed by: PHYSICAL THERAPIST

## 2025-06-30 NOTE — PATIENT INSTRUCTIONS
Access Code: [email or web address]  URL: [email or web address]  Date: 06/30/2025  Prepared by: Georgina Gomez    Exercises  - Seated Chin Tuck with Neck Elongation  - 1 x daily - 7 x weekly - 1 sets - 10 reps - 3-5 sec. hold  - Gentle Levator Scapulae Stretch  - 1 x daily - 7 x weekly - 1 sets - 3 reps - 20 sec. hold  - Upper Trapezius Stretch  - 1 x daily - 7 x weekly - 1 sets - 3 reps - 20 sec. hold  - Standing Cervical Extension AROM  - 1 x daily - 7 x weekly - 1 sets - 10 reps - 2-3 sec.  hold  - Standing Anatomical Position with Scapular Retraction and Depression at Wall  - 1 x daily - 7 x weekly - 3 sets - 10 reps

## 2025-06-30 NOTE — PROGRESS NOTES
Physical Therapy Treatment Note  [facility] [place]   [address] [place] [address]  P: [phone]       F: [phone]      Patient: Deepak Ruiz   : 1945  Treatment Diagnosis:     ICD-10-CM ICD-9-CM   1. Cervical spondylosis without myelopathy  M47.812 721.0   2. Cervical spinal stenosis  M48.02 723.0   3. Degenerative disc disease, cervical  M50.30 722.4   4. Posture imbalance  R29.3 729.90   5. Chronic neck and back pain  M54.2 723.1    M54.9 724.5    G89.29 338.29     Referring practitioner: LUZMA Strange  Date of Initial Visit: Type: THERAPY  Noted: 2025  Today's Date: 2025  Patient seen for 2 sessions           Subjective   Patient reports no new symptoms with his neck.    Objective     See Exercise, Manual, and Modality Logs for complete treatment.       Assessment/Plan  Patient performed exercise program with progressed exercises for flexibility and postural correction.  Tolerated progression well with no adverse symptoms.  He responded positively to manual therapy with improved muscle flexibility in bilateral cervical/UQ region.  Progressed HEP and provided written instructions for home use.  Progress per Plan of Care           Timed:         Manual Therapy:    8     mins  21856;     Therapeutic Exercise:    20     mins  76064;    Neuromuscular Indra:        mins  59805;    Therapeutic Activity:          mins  62267;     Gait Training:           mins  39533;     Ultrasound:          mins  77982;    Ionto                                  mins  81593  Self Care                            mins  03651  Canalith Repos         mins  31906  Orthotic MGMT/Train         mins  68259    Un-Timed:  Electrical Stimulation:         mins  28066 ( );  Dry Needling:          mins  15999 self-pay;  Dry Needling:          mins  61187 self-pay  Traction          mins  91040  Group Therapy:          mins  35571;    Timed Treatment:   28   mins    Total Treatment:     28   mins        PT SIGNATURE: Georgina Gomez PT     License Number: [id number]  Electronically signed by Georgina Gomez PT, 06/30/25, 11:06 AM EDT

## 2025-06-30 NOTE — TELEPHONE ENCOUNTER
Patient wants to get rescheduled for procedures that were ordered back in early May. He will need an ov again prior to being rescheduled as you must be seen in our office within 30 days of a procedure. LVM explaining this and to call back to schedule fu w/ APC Nazario Caceres for first available. Please schd if patient calls back.

## 2025-07-08 ENCOUNTER — TREATMENT (OUTPATIENT)
Age: 80
End: 2025-07-08
Payer: MEDICARE

## 2025-07-08 DIAGNOSIS — M54.2 CHRONIC NECK AND BACK PAIN: ICD-10-CM

## 2025-07-08 DIAGNOSIS — M50.30 DEGENERATIVE DISC DISEASE, CERVICAL: ICD-10-CM

## 2025-07-08 DIAGNOSIS — R29.3 POSTURE IMBALANCE: ICD-10-CM

## 2025-07-08 DIAGNOSIS — M48.02 CERVICAL SPINAL STENOSIS: ICD-10-CM

## 2025-07-08 DIAGNOSIS — M54.9 CHRONIC NECK AND BACK PAIN: ICD-10-CM

## 2025-07-08 DIAGNOSIS — M47.812 CERVICAL SPONDYLOSIS WITHOUT MYELOPATHY: Primary | ICD-10-CM

## 2025-07-08 DIAGNOSIS — G89.29 CHRONIC NECK AND BACK PAIN: ICD-10-CM

## 2025-07-08 NOTE — PROGRESS NOTES
Physical Therapy Treatment Note  [facility] [place]   [address] [place] [address]  P: [phone]       F: [phone]      Patient: Deepak Ruiz   : 1945  Treatment Diagnosis:     ICD-10-CM ICD-9-CM   1. Cervical spondylosis without myelopathy  M47.812 721.0   2. Cervical spinal stenosis  M48.02 723.0   3. Degenerative disc disease, cervical  M50.30 722.4   4. Posture imbalance  R29.3 729.90   5. Chronic neck and back pain  M54.2 723.1    M54.9 724.5    G89.29 338.29     Referring practitioner: LUZMA Strange  Date of Initial Visit: Type: THERAPY  Noted: 2025  Today's Date: 2025  Patient seen for 3 sessions           Subjective   Patient reports his neck feels about the same.  Has some questions about the home exercises.     Objective     See Exercise, Manual, and Modality Logs for complete treatment.       Assessment/Plan  Reviewed HEP and corrected technique.  Performed exercise program with progress exercises for strengthening and mobility.  He tolerated progression well with no adverse symptoms.  Responded positively to manual therapy with improved mobility and decreased muscle tightness.  Endrange pain in the cervical spine was still present.  Progressed HEP and provided written instructions for home use.  Progress per Plan of Care           Timed:         Manual Therapy:    16     mins  98468;     Therapeutic Exercise:    15     mins  14752;    Neuromuscular Indra:        mins  19526;    Therapeutic Activity:          mins  09265;     Gait Training:           mins  32929;     Ultrasound:          mins  81796;    Ionto                                  mins  88224  Self Care                            mins  96123  Canalith Repos         mins  21821  Orthotic MGMT/Train         mins  87512    Un-Timed:  Electrical Stimulation:         mins  85974 ( );  Dry Needling:          mins   self-pay;  Dry Needling:          mins    self-pay  Traction          mins  38491  Group Therapy:          mins  82095;    Timed Treatment:   31   mins   Total Treatment:     31   mins        PT SIGNATURE: Georgina Gomez PT     License Number: [id number]  Electronically signed by Georgina Gomez PT, 07/08/25, 8:17 AM EDT

## 2025-07-18 ENCOUNTER — TREATMENT (OUTPATIENT)
Age: 80
End: 2025-07-18
Payer: MEDICARE

## 2025-07-18 DIAGNOSIS — M54.9 CHRONIC NECK AND BACK PAIN: ICD-10-CM

## 2025-07-18 DIAGNOSIS — M47.812 CERVICAL SPONDYLOSIS WITHOUT MYELOPATHY: Primary | ICD-10-CM

## 2025-07-18 DIAGNOSIS — M54.2 CHRONIC NECK AND BACK PAIN: ICD-10-CM

## 2025-07-18 DIAGNOSIS — G89.29 CHRONIC NECK AND BACK PAIN: ICD-10-CM

## 2025-07-18 DIAGNOSIS — M50.30 DEGENERATIVE DISC DISEASE, CERVICAL: ICD-10-CM

## 2025-07-18 DIAGNOSIS — R29.3 POSTURE IMBALANCE: ICD-10-CM

## 2025-07-18 DIAGNOSIS — M48.02 CERVICAL SPINAL STENOSIS: ICD-10-CM

## 2025-07-18 NOTE — PROGRESS NOTES
Physical Therapy Treatment Note  [facility] [place]   [address] [place] [address]  P: [phone]       F: [phone]      Patient: Deepak Ruiz   : 1945  Treatment Diagnosis:     ICD-10-CM ICD-9-CM   1. Cervical spondylosis without myelopathy  M47.812 721.0   2. Cervical spinal stenosis  M48.02 723.0   3. Degenerative disc disease, cervical  M50.30 722.4   4. Posture imbalance  R29.3 729.90   5. Chronic neck and back pain  M54.2 723.1    M54.9 724.5    G89.29 338.29     Referring practitioner: LUZMA Strange  Date of Initial Visit: Type: THERAPY  Noted: 2025  Today's Date: 2025  Patient seen for 4 sessions           Subjective   Patient reports he lifted 4 bags of mulch yesterday-that triggered increased pain in neck and throat.  States he felt like his migue's apple pulled up with a muscle cramp in his throat.  Did not have difficulty breathing.  States he still has the pain in his left shoulder pain which he thinks is due to a rotator cuff tear.  Any motion that bends his head forward or to the side increased neck pain. States he does not want to continue with PT at this time due to his increased pain.      Objective     See Exercise, Manual, and Modality Logs for complete treatment.       Assessment/Plan  Based on patient's symptoms reports I am in agreement that continuing PT at this time would not be appropriate.  Patient was advised to consult with PCP or orthopedic doctor.  Will cancel remaining PT sessions and will resume at later date if appropriate.             Timed:         Manual Therapy:         mins  66553;     Therapeutic Exercise:         mins  70238;    Neuromuscular Indra:        mins  89090;    Therapeutic Activity:     10     mins  61262;     Gait Training:           mins  59920;     Ultrasound:          mins  20658;    Ionto                                  mins  67320  Self Care                            mins   87333  [facility]         mins  05851  Orthotic MGMT/Train         mins  24564    Un-Timed:  Electrical Stimulation:         mins  36636 ( );  Dry Needling:          mins  30345 self-pay;  Dry Needling:          mins  04083 self-pay  Traction          mins  35308  Group Therapy:          mins  86612;    Timed Treatment:  10    mins   Total Treatment:     10   mins        PT SIGNATURE: Georgina Gomez PT     License Number: [id number]  Electronically signed by Georgina Gomez PT, 07/18/25, 9:39 AM EDT

## 2025-08-04 ENCOUNTER — OFFICE VISIT (OUTPATIENT)
Dept: ORTHOPEDIC SURGERY | Facility: CLINIC | Age: 80
End: 2025-08-04
Payer: MEDICARE

## 2025-08-04 VITALS — HEIGHT: 69 IN | WEIGHT: 179 LBS | BODY MASS INDEX: 26.51 KG/M2 | TEMPERATURE: 98 F

## 2025-08-04 DIAGNOSIS — M54.12 CERVICAL RADICULOPATHY: Primary | ICD-10-CM

## 2025-08-04 PROCEDURE — 1159F MED LIST DOCD IN RCRD: CPT | Performed by: NURSE PRACTITIONER

## 2025-08-04 PROCEDURE — 1160F RVW MEDS BY RX/DR IN RCRD: CPT | Performed by: NURSE PRACTITIONER

## 2025-08-04 PROCEDURE — 99213 OFFICE O/P EST LOW 20 MIN: CPT | Performed by: NURSE PRACTITIONER

## 2025-08-21 ENCOUNTER — OFFICE VISIT (OUTPATIENT)
Dept: ORTHOPEDIC SURGERY | Facility: CLINIC | Age: 80
End: 2025-08-21
Payer: MEDICARE

## 2025-08-21 VITALS — WEIGHT: 177.2 LBS | TEMPERATURE: 98.9 F | HEIGHT: 69 IN | BODY MASS INDEX: 26.25 KG/M2

## 2025-08-21 DIAGNOSIS — M25.512 ACUTE PAIN OF LEFT SHOULDER: Primary | ICD-10-CM

## 2025-08-21 DIAGNOSIS — M75.82 TENDINITIS OF LEFT ROTATOR CUFF: ICD-10-CM

## 2025-08-21 RX ADMIN — METHYLPREDNISOLONE ACETATE 80 MG: 80 INJECTION, SUSPENSION INTRA-ARTICULAR; INTRALESIONAL; INTRAMUSCULAR; SOFT TISSUE at 09:40

## 2025-08-21 RX ADMIN — LIDOCAINE HYDROCHLORIDE 2 ML: 10 INJECTION, SOLUTION EPIDURAL; INFILTRATION; INTRACAUDAL; PERINEURAL at 09:40

## 2025-08-22 RX ORDER — METHYLPREDNISOLONE ACETATE 80 MG/ML
80 INJECTION, SUSPENSION INTRA-ARTICULAR; INTRALESIONAL; INTRAMUSCULAR; SOFT TISSUE
Status: COMPLETED | OUTPATIENT
Start: 2025-08-21 | End: 2025-08-21

## 2025-08-22 RX ORDER — LIDOCAINE HYDROCHLORIDE 10 MG/ML
2 INJECTION, SOLUTION EPIDURAL; INFILTRATION; INTRACAUDAL; PERINEURAL
Status: COMPLETED | OUTPATIENT
Start: 2025-08-21 | End: 2025-08-21